# Patient Record
Sex: FEMALE
[De-identification: names, ages, dates, MRNs, and addresses within clinical notes are randomized per-mention and may not be internally consistent; named-entity substitution may affect disease eponyms.]

---

## 2018-06-21 ENCOUNTER — HOSPITAL ENCOUNTER (OUTPATIENT)
Dept: HOSPITAL 88 - OR | Age: 53
Discharge: HOME | End: 2018-06-21
Attending: INTERNAL MEDICINE
Payer: COMMERCIAL

## 2018-06-21 DIAGNOSIS — Z12.11: Primary | ICD-10-CM

## 2018-06-21 DIAGNOSIS — K64.8: ICD-10-CM

## 2018-06-21 DIAGNOSIS — D12.3: ICD-10-CM

## 2018-06-21 DIAGNOSIS — D13.0: ICD-10-CM

## 2018-06-21 DIAGNOSIS — Z01.810: ICD-10-CM

## 2018-06-21 DIAGNOSIS — K57.30: ICD-10-CM

## 2018-06-21 DIAGNOSIS — K21.9: ICD-10-CM

## 2018-06-21 DIAGNOSIS — K20.9: ICD-10-CM

## 2018-06-21 DIAGNOSIS — K29.50: ICD-10-CM

## 2018-06-21 DIAGNOSIS — R03.0: ICD-10-CM

## 2018-06-21 PROCEDURE — 45384 COLONOSCOPY W/LESION REMOVAL: CPT

## 2018-06-21 PROCEDURE — 43239 EGD BIOPSY SINGLE/MULTIPLE: CPT

## 2018-06-21 PROCEDURE — 45385 COLONOSCOPY W/LESION REMOVAL: CPT

## 2018-06-21 PROCEDURE — 45378 DIAGNOSTIC COLONOSCOPY: CPT

## 2018-06-21 PROCEDURE — 93005 ELECTROCARDIOGRAM TRACING: CPT

## 2018-06-21 NOTE — OPERATIVE REPORT
DATE OF PROCEDURE:  June 21, 2018 



REFERRING PHYSICIAN:  Isabela Vidal MD  



PROCEDURES PERFORMED

1. Esophagogastroduodenoscopy with biopsies.  

2. Colonoscopy with polypectomy.  



INDICATIONS FOR EGD:  Epigastric pain.  



INDICATIONS FOR COLONOSCOPY:  Colorectal cancer screening.



MEDICATION:  Patient was done under MAC.  Please see anesthesiologist's 

note.



PROCEDURE:  With the patient in the left lateral decubitus position, the 

flexible fiberoptic Olympus gastroscope was introduced into the esophagus 

under direct visualization without any difficulty.  A minute polypoid 

lesion suspicious for squamous papilloma was removed per the cold biopsy 

forceps in the cervical esophagus.  The mucosa overlying the distal 

esophagus revealed some patchy areas of erythema.  The scope was then 

advanced with ease into the stomach.  Mucosa overlying the antrum and the 

body revealed some patchy erythema and low-grade to moderate edema, and 

biopsies were obtained and sent to stain for H. pylori.  Pylorus appeared 

to be of normal contour and shape.  It was intubated with ease, and the 

scope was advanced all the way to the 2nd portion of the duodenum.  The 

scope was then withdrawn slowly.  Mucosa overlying the proximal 2nd portion 

and the duodenal bulb appeared to be within normal limits.  The scope was 

then withdrawn back into the stomach and retroflexed.  The mucosa overlying 

the fundus and the cardia appeared to be within normal limits.  The scope 

was then straightened out.  The stomach was decompressed.  Scope was 

subsequently withdrawn.  Patient tolerated the procedure well.



IMPRESSION

1. Rule out squamous papilloma, cervical esophagus.

2. Distal esophagitis.

3. Gastritis, biopsied.  Biopsies sent to stain for H. pylori.



PLAN:  Follow up histology.  Initiate Protonix 40 mg 1 p.o. q.a.m. a.c.



The patient was then turned around.  After adequate lubrication of the anal 

canal, a flexible fiberoptic Olympus colonoscope was inserted into the 

rectum with ease and advanced all the way to the cecum.  The scope was then 

withdrawn slowly.  Mucosa overlying the cecum and ascending colon appeared 

to be within normal limits.  One polyp was snared from the transverse 

colon.  The descending appeared to be within normal limits.  Some minimal 

diverticular disease was noted in the sigmoid colon.  The rectum appeared 

to be within normal limits.  The scope was then retroflexed into the distal 

rectum, and small internal hemorrhoids were noted, none of which was 

actively bleeding.  The scope was then straightened out.  It was 

subsequently withdrawn.  Patient tolerated the procedure well.  



IMPRESSION

1. Transverse colon polyp, hot biopsied.

2. Diverticulosis, minimal.

3. Internal hemorrhoids, none actively bleeding.  



PLAN:  Follow up histology.  Initiate high-fiber, low-fat diet.  Initiate 

high-fiber supplement.  Patient might benefit from a followup colonoscopy 

in 5 years.  

  





DD:  06/21/2018 10:38

DT:  06/21/2018 10:43

Job#:  [id number] 



cc:ISABELA VIDAL MD

## 2019-03-27 ENCOUNTER — HOSPITAL ENCOUNTER (OUTPATIENT)
Dept: HOSPITAL 88 - US | Age: 54
End: 2019-03-27
Attending: INTERNAL MEDICINE
Payer: COMMERCIAL

## 2019-03-27 DIAGNOSIS — R10.9: Primary | ICD-10-CM

## 2019-03-27 PROCEDURE — 76705 ECHO EXAM OF ABDOMEN: CPT

## 2019-03-27 PROCEDURE — 78227 HEPATOBIL SYST IMAGE W/DRUG: CPT

## 2019-03-27 NOTE — DIAGNOSTIC IMAGING REPORT
EXAM: US GALLBLADDER

DATE: 3/27/2019 1:28 PM  

INDICATION:   Abdominal pain

COMPARISON: None 

TECHNIQUE: Transverse and longitudinal gray scale and color doppler sonographic

images of the upper abdomen were obtained. 



FINDINGS:     

LIVER

13 cm in the right midclavicular line.

Normal echogenicity, normal contour, no masses.





GALLBLADDER

No stones, sludge, wall-thickening or pericholecystic fluid.

Negative sonographic Dawson's sign.



BILE DUCTS

No intra nor extra-hepatic biliary dilation.

Common bile duct measures 0.3 cm



PANCREAS: Limited due to overlying bowel gas



RIGHT KIDNEY: 11 cm

Echogenicity: Normal

Collecting System:  No hydronephrosis

Stones:  None

Cyst/Mass: None



VESSELS:

Aorta: Poorly visualized proximally. Nonaneurysmal distally.

Inferior Vena Cava: Patent

Main Portal Vein: 0.9 cm, normal size with hepatopetal flow.



FREE FLUID: None



IMPRESSION:

Unremarkable right upper quadrant ultrasound. No cholelithiasis or sonographic

evidence of acute cholecystitis. 



Signed by: Dr. Melo Villa M.D. on 3/27/2019 2:12 PM

## 2019-03-28 NOTE — DIAGNOSTIC IMAGING REPORT
Hepatobiliary Scan with Gallbladder Ejection Fraction



Clinical information: 53 F with intermittent u5pper abdominal pain x 2 weeks



Technique: Following intravenous administration of 6.5 millicuries of Tc-99m

mebrofenin, dynamic images of the abdomen in the anterior projection were

obtained through 30 minutes.  Sincalide (CCK analog) 1.3 micrograms was

administered intravenously over 30 minutes with additional imaging for

determination of gallbladder ejection fraction.



Discussion: Perfusion of the liver is normal.  Extraction of tracer by the

liver parenchyma is normal.  Tracer appears promptly within the biliary tract. 

The gallbladder begins to fill at 10 minutes post injection of tracer and fills

adequately.  Tracer is seen in the small bowel during the sincalide infusion. 

There is no contractile response by the gallbladder to the pharmacologic dose

of sincalide.  No emptying of the gallbladder occurs during the 30 minute

infusion.  



Impression: 



1.  Filling of the gallbladder excludes acute cystic duct obstruction/acute

cholecystitis.

2.  The gallbladder ejection fraction is undefined as there is no emptying of

the gallbladder during the infusion of sincalide.  This absence of a

contractile response to sincalide supports the clinical diagnosis of chronic

cholecystitis/gallbladder dyskinesia.



Signed by: Dr. Elana Lazaro M.D. on 3/28/2019 11:11 AM

## 2019-04-19 LAB
ALBUMIN SERPL-MCNC: 4 G/DL (ref 3.5–5)
ALBUMIN/GLOB SERPL: 1.1 {RATIO} (ref 0.8–2)
ALP SERPL-CCNC: 46 IU/L (ref 40–150)
ALT SERPL-CCNC: 17 IU/L (ref 0–55)
ANION GAP SERPL CALC-SCNC: 12.4 MMOL/L (ref 8–16)
BASOPHILS # BLD AUTO: 0.1 10*3/UL (ref 0–0.1)
BASOPHILS NFR BLD AUTO: 1.6 % (ref 0–1)
BILIRUB UR QL: NEGATIVE
BUN SERPL-MCNC: 11 MG/DL (ref 7–26)
BUN/CREAT SERPL: 13 (ref 6–25)
CALCIUM SERPL-MCNC: 9.5 MG/DL (ref 8.4–10.2)
CHLORIDE SERPL-SCNC: 107 MMOL/L (ref 98–107)
CLARITY UR: (no result)
CO2 SERPL-SCNC: 27 MMOL/L (ref 22–29)
COLOR UR: YELLOW
DEPRECATED NEUTROPHILS # BLD AUTO: 1.6 10*3/UL (ref 2.1–6.9)
EGFRCR SERPLBLD CKD-EPI 2021: > 60 ML/MIN (ref 60–?)
EOSINOPHIL # BLD AUTO: 0.1 10*3/UL (ref 0–0.4)
EOSINOPHIL NFR BLD AUTO: 1.8 % (ref 0–6)
ERYTHROCYTE [DISTWIDTH] IN CORD BLOOD: 13 % (ref 11.7–14.4)
GLOBULIN PLAS-MCNC: 3.7 G/DL (ref 2.3–3.5)
GLUCOSE SERPLBLD-MCNC: 82 MG/DL (ref 74–118)
HCT VFR BLD AUTO: 46 % (ref 34.2–44.1)
HGB BLD-MCNC: 15.4 G/DL (ref 12–16)
KETONES UR QL STRIP.AUTO: NEGATIVE
LEUKOCYTE ESTERASE UR QL STRIP.AUTO: (no result)
LYMPHOCYTES # BLD: 1.8 10*3/UL (ref 1–3.2)
LYMPHOCYTES NFR BLD AUTO: 46.7 % (ref 18–39.1)
MCH RBC QN AUTO: 31.6 PG (ref 28–32)
MCHC RBC AUTO-ENTMCNC: 33.5 G/DL (ref 31–35)
MCV RBC AUTO: 94.5 FL (ref 81–99)
MONOCYTES # BLD AUTO: 0.3 10*3/UL (ref 0.2–0.8)
MONOCYTES NFR BLD AUTO: 8.4 % (ref 4.4–11.3)
NEUTS SEG NFR BLD AUTO: 41.2 % (ref 38.7–80)
NITRITE UR QL STRIP.AUTO: NEGATIVE
PLATELET # BLD AUTO: 204 X10E3/UL (ref 140–360)
POTASSIUM SERPL-SCNC: 4.4 MMOL/L (ref 3.5–5.1)
PROT UR QL STRIP.AUTO: (no result)
RBC # BLD AUTO: 4.87 X10E6/UL (ref 3.6–5.1)
SODIUM SERPL-SCNC: 142 MMOL/L (ref 136–145)
SP GR UR STRIP: 1.01 (ref 1.01–1.02)
UROBILINOGEN UR STRIP-MCNC: 0.2 MG/DL (ref 0.2–1)

## 2019-04-22 ENCOUNTER — HOSPITAL ENCOUNTER (OUTPATIENT)
Dept: HOSPITAL 88 - OR | Age: 54
Discharge: HOME | End: 2019-04-22
Attending: SURGERY
Payer: COMMERCIAL

## 2019-04-22 VITALS — SYSTOLIC BLOOD PRESSURE: 124 MMHG | DIASTOLIC BLOOD PRESSURE: 81 MMHG

## 2019-04-22 DIAGNOSIS — K81.1: Primary | ICD-10-CM

## 2019-04-22 DIAGNOSIS — K82.8: ICD-10-CM

## 2019-04-22 DIAGNOSIS — Z01.812: ICD-10-CM

## 2019-04-22 DIAGNOSIS — M19.90: ICD-10-CM

## 2019-04-22 DIAGNOSIS — K21.9: ICD-10-CM

## 2019-04-22 DIAGNOSIS — I49.1: ICD-10-CM

## 2019-04-22 DIAGNOSIS — Z01.810: ICD-10-CM

## 2019-04-22 PROCEDURE — 85025 COMPLETE CBC W/AUTO DIFF WBC: CPT

## 2019-04-22 PROCEDURE — 36415 COLL VENOUS BLD VENIPUNCTURE: CPT

## 2019-04-22 PROCEDURE — 80053 COMPREHEN METABOLIC PANEL: CPT

## 2019-04-22 PROCEDURE — 81003 URINALYSIS AUTO W/O SCOPE: CPT

## 2019-04-22 PROCEDURE — 93005 ELECTROCARDIOGRAM TRACING: CPT

## 2019-04-22 PROCEDURE — 88304 TISSUE EXAM BY PATHOLOGIST: CPT

## 2019-04-22 NOTE — OPERATIVE REPORT
DATE OF PROCEDURE:  04/22/2019

 

SURGEON:  Dante Agudelo MD

 

PREOPERATIVE DIAGNOSIS:  Biliary dyskinesia.

 

POSTOPERATIVE DIAGNOSIS:  Biliary dyskinesia.

 

OPERATION PERFORMED:  Laparoscopic cholecystectomy.

 

ASSISTANT:  Dr. Rajesh Agudelo and MAURICIO Rodriguez.

 

ANESTHESIA:  General.

 

COMPLICATIONS:  None.

 

ESTIMATED BLOOD LOSS:  Minimal.

 

PROCEDURE IN DETAIL:  With the patient lying in bed in the supine position under good

general endotracheal anesthesia, the abdomen was prepped with Betadine solution and

draped in the usual manner.  A Veress needle was introduced into the umbilicus and

pneumoperitoneum was established without any difficulty.  An 11 mm trocar was placed

into the umbilicus and a 10 mm video laparoscope was placed into the intraabdominal

cavity.  Under direct vision, three 5 mm trocars were placed in the right subcostal

region and video laparoscopy at this point revealed a gallbladder that was covered with

adhesions and showed all the signs consistent with cholesterolosis.  The stomach and the

pylorus also had some adhesions to the russ hepatis area and the stomach was somewhat

distended.  The rest of the abdominal exploration was otherwise within normal limits.

All the adhesions of the gallbladder and the stomach were then slowly and carefully

taken down.  The peritoneum overlying the neck of the gallbladder was then opened and

the cystic duct was identified.  The cystic duct was followed to its junction with the

common duct.  The cystic duct was then circumferentially dissected away from the common

duct, doubly clipped and divided.  The cystic artery was similarly doubly clipped and

divided.  The gallbladder was then slowly and carefully taken off the liver bed using

the cautery scissors and perfect hemostasis was ascertained.  The gallbladder was

grasped through the umbilical port and removed without any difficulty.  Video

laparoscopy was then again carried out.  The liver bed was found to be perfectly dry.

All the excess fluid was aspirated, the pneumoperitoneum was evacuated and all the

trocars were removed under direct vision.  The midline fascia at the umbilicus was then

closed with a figure-of-eight of 0 Vicryl.  All layers were infiltrated on the way out

with solution of 0.25% Marcaine.  Subcutaneous tissue was approximated with 3-0 Vicryl

and the skin was closed with subcuticular 5-0 Vicryl.  Benzoin, Steri-Strips, and

Band-Aids were applied.  The sponge, lap, and needle count was correct.  The patient

tolerated the procedure well and returned to the recovery room in stable condition. 

 

 

 

 

______________________________

MD MARCK Belcher/ELAINE

D:  04/22/2019 12:22:27

T:  04/22/2019 16:19:14

Job #:  [id number]

## 2019-04-22 NOTE — XMS REPORT
Patient Summary Document

                             Created on: 2019



URVASHI PRADO

External Reference #: [id number]

: 1965

Sex: Female



Demographics







                          Address                   [address] [place] [address]

 

                          Home Phone                [phone]

 

                          Preferred Language        Unknown

 

                          Marital Status            Unknown

 

                          [employer] Affiliation     Unknown

 

                          Race                      Unknown

 

                                        Additional Race(s)  

 

                          Ethnic Group              Unknown





Author







                          Author                    [facility]

 

                          Address                   Unknown

 

                          Phone                     Unavailable







Care Team Providers







                    Care Team Member Name    Role                Phone

 

                    TRICIA MELO    Unavailable         Unavailable







Problems

This patient has no known problems.



Allergies, Adverse Reactions, Alerts

This patient has no known allergies or adverse reactions.



Medications

This patient has no known medications.



Results







           Test Description    Test Time    Test Comments    Text Results    Atomic Results    Result

 Comments

 

                [facility]    2019 11:09:00                                                      

                                                    [place] [address]      Patient Name: URVASHI PRADO                                   MR
#: [id number]                     : 1965                               
   Age/Sex: 53/F  Acct #: [id number]                              Req #: [id number]
[id number]  [facility] Physician:                                                      
Ordered by: TRICIA MELO MD                            Report #: [id number]   
    Location: US                                      Room/Bed:                 
   
___________________________________________________________________________________________________
   Procedure: [id number] NM/HEPTOBILIARY [facility]  Exam Date: 19            
               Exam Time: 1500                                              
REPORT STATUS: Signed    Hepatobiliary Scan with Gallbladder Ejection Fraction  
   Clinical information: 53 F with intermittent u5pper abdominal pain x 2 weeks 
    Technique: Following intravenous administration of 6.5 millicuries of Tc-99m
  mebrofenin, dynamic images of the abdomen in the anterior projection were   
obtained through 30 minutes.  Sincalide (CCK analog) 1.3 micrograms was   
administered intravenously over 30 minutes with additional imaging for   
determination of gallbladder ejection fraction.      Discussion: Perfusion of 
the liver is normal.  Extraction of tracer by the   liver parenchyma is normal. 
Tracer appears promptly within the biliary tract.    The gallbladder begins to 
fill at 10 minutes post injection of tracer and fills   adequately.  Tracer is 
seen in the small bowel during the sincalide infusion.    There is no 
contractile response by the gallbladder to the pharmacologic dose   of 
sincalide.  No emptying of the gallbladder occurs during the 30 minute   
infusion.        Impression:       1.  Filling of the gallbladder excludes acute
cystic duct obstruction/acute   cholecystitis.   2.  The gallbladder ejection 
fraction is undefined as there is no emptying of   the gallbladder during the 
infusion of sincalide.  This absence of a   contractile response to sincalide 
supports the clinical diagnosis of chronic   cholecystitis/gallbladder 
dyskinesia.      Signed by: Dr. Karina Lazaro M.D. on 3/28/2019 11:11 AM        
Dictated By: KARINA LAZARO MD  Electronically Signed By: KARINA LAZARO MD on 
19 1111  Transcribed By: SHARLENE on 19 1111       COPY TO:   
TRICIA MELO MD                        

 

                 GALLBLADDER    2019 14:09:00                                                            

                                              [place] [address] 
    Patient Name: URVASHI PRADO                                   MR #: 
[id number]                     : 1965                                  
Age/Sex: 53/F  Acct #: [id number]                              Req #: [id number]
[id number]  Adm Physician:                                                      
Ordered by: TRICIA MELO MD                            Report #: [id number]   
    Location:                                       Room/Bed:                 
   
___________________________________________________________________________________________________
   Procedure: [id number] US/US GALLBLADDER  Exam Date: 19                  
         Exam Time: 1341                                              REPORT 
STATUS: Signed    EXAM: US GALLBLADDER   DATE: 3/27/2019 1:28 PM     INDICATION:
  Abdominal pain   COMPARISON: None    TECHNIQUE: Transverse and longitudinal 
gray scale and color doppler sonographic   images of the upper abdomen were 
obtained.       FINDINGS:        LIVER   13 cm in the right midclavicular line. 
 Normal echogenicity, normal contour, no masses.         GALLBLADDER   No 
stones, sludge, wall-thickening or pericholecystic fluid.   Negative sonographic
Dawson's sign.      BILE DUCTS   No intra nor extra-hepatic biliary dilation.   
Common bile duct measures 0.3 cm      PANCREAS: Limited due to overlying bowel 
gas      RIGHT KIDNEY: 11 cm   Echogenicity: Normal   Collecting System:  No 
hydronephrosis   Stones:  None   Cyst/Mass: None      VESSELS:   Aorta: Poorly 
visualized proximally. Nonaneurysmal distally.   Inferior Vena Cava: Patent   
Main Portal Vein: 0.9 cm, normal size with hepatopetal flow.      FREE FLUID: 
None      IMPRESSION:   Unremarkable right upper quadrant ultrasound. No 
cholelithiasis or sonographic   evidence of acute cholecystitis.       Signed 
by: Dr. Sola Arroyo M.D. on 3/27/2019 2:12 PM        Dictated By: SOLA ARROYO MD  Electronically Signed By: SOLA ARROYO MD on 19 1412  
Transcribed By: SHARLENE on 19 1412       COPY TO:   TRICIA MELO MD